# Patient Record
Sex: MALE | Race: WHITE | NOT HISPANIC OR LATINO | URBAN - NONMETROPOLITAN AREA
[De-identification: names, ages, dates, MRNs, and addresses within clinical notes are randomized per-mention and may not be internally consistent; named-entity substitution may affect disease eponyms.]

---

## 2018-09-19 NOTE — PROCEDURE NOTE: CLINICAL
PROCEDURE NOTE: Excision of Eyelid Lesion Right Upper Lid. Diagnosis: Eyelid Lesion, Uncertain Behavior. Prior to treatment, the risks/benefits/alternatives were discussed. The patient wished to proceed with procedure. Local anesthetic was given. The eyelid was prepped and draped for procedure. The lesion was incised and removed. The wound was repaired with sutures. Patient tolerated procedure well. There were no complications. Post procedure instructions given. Bolivar Quiñones

## 2019-05-14 ENCOUNTER — IMPORTED ENCOUNTER (OUTPATIENT)
Dept: URBAN - NONMETROPOLITAN AREA CLINIC 1 | Facility: CLINIC | Age: 64
End: 2019-05-14

## 2019-05-14 PROBLEM — H25.13: Noted: 2019-05-14

## 2019-05-14 PROCEDURE — 92014 COMPRE OPH EXAM EST PT 1/>: CPT

## 2019-05-14 PROCEDURE — 92015 DETERMINE REFRACTIVE STATE: CPT

## 2019-05-14 NOTE — PATIENT DISCUSSION
*Gls RX:.-	  A new spectacle prescription was created and dispensed today. AMBLYOPIA FARHAN aleman DR-Stressed the importance of keeping blood sugars under control and regular visits with PCP. -Explained the possible effects of poorly controlled diabetes and the damage that diabetes can cause to ocular health. -Pt instructed to contact our office with any vision changes. Cataract OU-Not yet surgical. -Reviewed symptoms of advancing cataract growth such as glare and halos and decreased vision.-Continue to monitor for now. Pt will notify us if any new symptoms develop.

## 2020-05-19 ENCOUNTER — IMPORTED ENCOUNTER (OUTPATIENT)
Dept: URBAN - NONMETROPOLITAN AREA CLINIC 1 | Facility: CLINIC | Age: 65
End: 2020-05-19

## 2020-05-19 PROCEDURE — 92015 DETERMINE REFRACTIVE STATE: CPT

## 2020-05-19 PROCEDURE — 92014 COMPRE OPH EXAM EST PT 1/>: CPT

## 2020-05-19 NOTE — PATIENT DISCUSSION
*Gls RX:.-	  A new spectacle prescription was created and dispensed today. AMBLYOPIA ODmonitorDM s DR-Stressed the importance of keeping blood sugars under control and regular visits with PCP. -Explained the possible effects of poorly controlled diabetes and the damage that diabetes can cause to ocular health. -Pt instructed to contact our office with any vision changes. Cataract OU-Not yet surgical. -Reviewed symptoms of advancing cataract growth such as glare and halos and decreased vision.-Continue to monitor for now. Pt will notify us if any new symptoms develop.

## 2021-05-25 ENCOUNTER — IMPORTED ENCOUNTER (OUTPATIENT)
Dept: URBAN - NONMETROPOLITAN AREA CLINIC 1 | Facility: CLINIC | Age: 66
End: 2021-05-25

## 2021-05-25 PROBLEM — H25.13: Noted: 2021-05-25

## 2021-05-25 PROBLEM — H53.021: Noted: 2021-05-25

## 2021-05-25 PROBLEM — E11.9: Noted: 2021-05-25

## 2021-05-25 PROCEDURE — 92014 COMPRE OPH EXAM EST PT 1/>: CPT

## 2021-05-25 PROCEDURE — 92015 DETERMINE REFRACTIVE STATE: CPT

## 2021-05-25 NOTE — PATIENT DISCUSSION
AMBLYOPIA ODmonitorDM s DR-Stressed the importance of keeping blood sugars under control and regular visits with PCP. -Explained the possible effects of poorly controlled diabetes and the damage that diabetes can cause to ocular health. -Pt instructed to contact our office with any vision changes. Cataract OU-Not yet surgical. -Reviewed symptoms of advancing cataract growth such as glare and halos and decreased vision.-Continue to monitor for now. Pt will notify us if any new symptoms develop.

## 2022-04-09 ASSESSMENT — VISUAL ACUITY
OU_CC: 20/30
OD_SC: 20/40
OS_CC: J2
OD_SC: 20/40
OS_SC: 20/20
OD_CC: J1
OD_SC: 20/40
OS_CC: J1
OD_CC: J2

## 2022-04-09 ASSESSMENT — TONOMETRY
OD_IOP_MMHG: 17
OS_IOP_MMHG: 17
OD_IOP_MMHG: 17
OS_IOP_MMHG: 18
OD_IOP_MMHG: 17
OS_IOP_MMHG: 17

## 2022-11-07 ENCOUNTER — ESTABLISHED PATIENT (OUTPATIENT)
Dept: RURAL CLINIC 1 | Facility: CLINIC | Age: 67
End: 2022-11-07

## 2022-11-07 DIAGNOSIS — H52.4: ICD-10-CM

## 2022-11-07 DIAGNOSIS — E11.9: ICD-10-CM

## 2022-11-07 DIAGNOSIS — H53.021: ICD-10-CM

## 2022-11-07 DIAGNOSIS — H25.13: ICD-10-CM

## 2022-11-07 PROCEDURE — 92014 COMPRE OPH EXAM EST PT 1/>: CPT

## 2022-11-07 PROCEDURE — 92015 DETERMINE REFRACTIVE STATE: CPT

## 2022-11-07 ASSESSMENT — VISUAL ACUITY
OD_CC: 20/50
OU_CC: 20/30
OU_CC: 20/20
OS_CC: 20/20
OD_PH: 20/40

## 2022-11-07 ASSESSMENT — TONOMETRY
OD_IOP_MMHG: 17
OS_IOP_MMHG: 17

## 2024-04-25 ENCOUNTER — ESTABLISHED PATIENT (OUTPATIENT)
Dept: RURAL CLINIC 1 | Facility: CLINIC | Age: 69
End: 2024-04-25

## 2024-04-25 DIAGNOSIS — H53.021: ICD-10-CM

## 2024-04-25 DIAGNOSIS — E11.9: ICD-10-CM

## 2024-04-25 DIAGNOSIS — H25.13: ICD-10-CM

## 2024-04-25 PROCEDURE — 92014 COMPRE OPH EXAM EST PT 1/>: CPT

## 2024-04-25 ASSESSMENT — VISUAL ACUITY
OS_CC: 20/25
OD_CC: 20/60

## 2024-04-25 ASSESSMENT — TONOMETRY
OD_IOP_MMHG: 18
OS_IOP_MMHG: 18